# Patient Record
Sex: FEMALE | Race: WHITE | NOT HISPANIC OR LATINO | Employment: FULL TIME | URBAN - METROPOLITAN AREA
[De-identification: names, ages, dates, MRNs, and addresses within clinical notes are randomized per-mention and may not be internally consistent; named-entity substitution may affect disease eponyms.]

---

## 2024-01-03 ENCOUNTER — HOSPITAL ENCOUNTER (EMERGENCY)
Facility: CLINIC | Age: 49
Discharge: HOME | End: 2024-01-03
Attending: EMERGENCY MEDICINE | Admitting: EMERGENCY MEDICINE
Payer: COMMERCIAL

## 2024-01-03 VITALS
RESPIRATION RATE: 16 BRPM | WEIGHT: 150 LBS | HEIGHT: 67 IN | BODY MASS INDEX: 23.54 KG/M2 | OXYGEN SATURATION: 99 % | TEMPERATURE: 98.5 F | HEART RATE: 75 BPM | SYSTOLIC BLOOD PRESSURE: 148 MMHG | DIASTOLIC BLOOD PRESSURE: 89 MMHG

## 2024-01-03 DIAGNOSIS — R45.851 SUICIDAL THOUGHTS: ICD-10-CM

## 2024-01-03 DIAGNOSIS — F41.9 ANXIETY: ICD-10-CM

## 2024-01-03 DIAGNOSIS — F33.2 MDD (MAJOR DEPRESSIVE DISORDER), RECURRENT SEVERE, WITHOUT PSYCHOSIS (H): ICD-10-CM

## 2024-01-03 PROBLEM — F32.9 MAJOR DEPRESSIVE DISORDER, SINGLE EPISODE, UNSPECIFIED: Status: ACTIVE | Noted: 2024-01-03

## 2024-01-03 PROBLEM — F43.22 ADJUSTMENT DISORDER WITH ANXIOUS MOOD: Status: ACTIVE | Noted: 2024-01-03

## 2024-01-03 PROCEDURE — 99284 EMERGENCY DEPT VISIT MOD MDM: CPT | Performed by: NURSE PRACTITIONER

## 2024-01-03 PROCEDURE — 99284 EMERGENCY DEPT VISIT MOD MDM: CPT

## 2024-01-03 RX ORDER — TRAZODONE HYDROCHLORIDE 50 MG/1
25-50 TABLET, FILM COATED ORAL AT BEDTIME
Qty: 30 TABLET | Refills: 0 | Status: SHIPPED | OUTPATIENT
Start: 2024-01-03

## 2024-01-03 RX ORDER — ESCITALOPRAM OXALATE 10 MG/1
TABLET ORAL
Qty: 30 TABLET | Refills: 0 | Status: SHIPPED | OUTPATIENT
Start: 2024-01-03

## 2024-01-03 ASSESSMENT — ACTIVITIES OF DAILY LIVING (ADL)
ADLS_ACUITY_SCORE: 35

## 2024-01-03 NOTE — ED TRIAGE NOTES
Pt called her ex-  New Years Rhea stating she was so depressed that she was going to hurt herself - thinking of suicide. Pt is tearful in triage .      Triage Assessment (Adult)       Row Name 01/03/24 0827          Triage Assessment    Airway WDL WDL        Respiratory WDL    Respiratory WDL WDL        Skin Circulation/Temperature WDL    Skin Circulation/Temperature WDL WDL        Cardiac WDL    Cardiac WDL WDL        Peripheral/Neurovascular WDL    Peripheral Neurovascular WDL WDL        Cognitive/Neuro/Behavioral WDL    Cognitive/Neuro/Behavioral WDL WDL

## 2024-01-03 NOTE — PROGRESS NOTES
Patient in agreement to discharge plan. Discharge instructions reviewed with patient including follow-up care plan. Reviewed safety plan, outpatient resources and new medication. Denies SI,HI and hallucinations. All belongings that were brought into the hospital have been returned to patient. Escorted off the unit at door 6 accompanied by [facility] staff. Discharged to home, ex  to pick her up.

## 2024-01-03 NOTE — DISCHARGE INSTRUCTIONS
Type: Therapy - Initial (In-Person)  Date: Monday, 1/8/2024  Time: 2:00 pm - 3:00 pm  Provider: India SPEAR  Location: [facility], [address] [place] [address]  Phone: [phone]  No patient instructions were provided.  Please call them to confirm the appointment and inquire to see if they need any further intake paperwork.     Aftercare Plan  If I am feeling unsafe or I am in a crisis, I will:   Contact my established care providers   Call the National Suicide Prevention Lifeline: 988  Go to the nearest emergency room   Call 911       Additional Information  Today you were seen by a licensed mental health professional through Triage and Transition services, [facility] ([facility])  for a crisis assessment in the Emergency Department at [facility].  It is recommended that you follow up with your established providers (psychiatrist, mental health therapist, and/or primary care doctor - as relevant) as soon as possible. Coordinators from [facility] will be calling you in the next 24-48 hours to ensure that you have the resources you need.  You can also contact [facility] coordinators directly at [phone]. You may have been scheduled for or offered an appointment with a mental health provider. [facility] maintains an extensive network of licensed behavioral health providers to connect patients with the services they need.  We do not charge providers a fee to participate in our referral network.  We match patients with providers based on a patient's specific needs, insurance coverage, and location.  Our first effort will be to refer you to a provider within your care system, and will utilize providers outside your care system as needed.

## 2024-01-03 NOTE — PROGRESS NOTES
Triage & Transition Services, Extended Care     Client Name: Kylah Leonard    Date: January 3, 2024    Patient was seen    Mode of Assessment:      Service Type: (P) refused to attend group session  Session Start Time:  (P) 1050    Session End Time: (P) 1103  Session Length: (P) 13 minutes  Site Location: [facility] EMERGENCY DEPT                             [id number]  Total Number ofAttendees: (P) 0    Alexsander Davila   Extended Care Coordinator  [phone]

## 2024-01-03 NOTE — ED PROVIDER NOTES
[facility] Unit - Psychiatric Consultation  [facility] Emergency Department    Kylah Leonard MRN: [id number]   Age: 48 year old YOB: 1975     History     Chief Complaint   Patient presents with    Depression     HPI  Kylah Leonard is a 48 year old female with history notable for anxiety and mild depression who presented to the emergency department for evaluation of suicidal ideation.  She called her ex- on New Year's Rhea, stating that she had thought about taking an excess amount of sleeping pills in hopes of not waking up.  She denies taking any medications.  She was medically evaluated in the emergency department and determined to be medically stable for transfer to [facility] for further psychiatric assessment.  Here at [facility], patient describes that she left her  about 1 year ago to pursue a relationship with another man.  That relationship recently ended, and patient is now feeling more lonely and isolated.  She lives alone in an apartment and does not have much social support.  She has been feeling increasingly depressed, hopeless, helpless, and worthless.  She endorses anhedonia.  She endorses poor sleep, sleeping around 5 hours per night but waking up for about 1-1/2 hours in the middle of the night.  She reports her appetite is lower than usual, stating that her coping mechanism is generally to eat, but recently has experienced a loss of appetite.  She acknowledges that she had recently contemplating overdosing on sleeping pills, but has been able to maintain her safety over the last several nights.  She denies active thoughts, plans, or intents to end her life.  She feels comfortable maintaining her safety at home and agrees to reach out should her thoughts intensify.  She is open to antidepressant medication to help treat her symptoms of depression and anxiety, and is looking forward to initiating outpatient therapy.  She anticipates discharge home following this meeting.    Past  "Medical History  No past medical history on file.  No past surgical history on file.  escitalopram (LEXAPRO) 10 MG tablet  traZODone (DESYREL) 50 MG tablet      No Known Allergies  Family History  No family history on file.  Social History       Past medical history, past surgical history, medications, allergies, family history, and social history were reviewed with the patient. No additional pertinent items.       Review of Systems  A medically appropriate review of systems was performed with pertinent positives and negatives noted in the HPI, and all other systems negative.    Physical Examination   BP: (!) 156/95  Pulse: 93  Temp: 99  F (37.2  C)  Resp: 16  Height: 170.2 cm (5' 7\")  Weight: 68 kg (150 lb)  SpO2: 97 %    Physical Exam  General: Appears stated age.   Neuro: Alert and fully oriented. Extremities appear to demonstrate normal strength on visual inspection.   Integumentary/Skin: no rash visualized, normal color    Psychiatric Examination   Appearance: awake, alert, adequately groomed, appeared as age stated, and casually dressed  Attitude:  cooperative  Eye Contact:  fair  Mood:  depressed  Affect:  mood congruent and intensity is blunted  Speech:  clear, coherent and normal prosody  Psychomotor Behavior:  no evidence of tardive dyskinesia, dystonia, or tics  Thought Process:  linear and goal oriented  Associations:  no loose associations  Thought Content:  no evidence of suicidal ideation or homicidal ideation and no evidence of psychotic thought  Insight:  good  Judgement:  intact  Oriented to:  time, person, and place  Attention Span and Concentration:  fair  Recent and Remote Memory:  intact  Language: able to name/identify objects without impairment  Fund of Knowledge: intact with awareness of current and past events    ED Course        Labs Ordered and Resulted from Time of ED Arrival to Time of ED Departure - No data to display    Assessments & Plan (with Medical Decision Making)   Patient " presenting with suicidal ideation in the context of worsening depressive symptoms and anxiety.  She is struggling with social isolation and withdrawal, following a recent break-up.  Remote history of treatment with sertraline for a short period of time, which patient did not find effective.  She is help-seeking, looking forward to individual therapy along with medication to help treat her symptoms.  Nursing notes reviewed noting no acute issues.     I have reviewed the assessment completed by the [facility].     Preliminary diagnosis:    ICD-10-CM    1. Suicidal thoughts  R45.851       2. MDD (major depressive disorder), recurrent severe, without psychosis (H)  F33.2       3. Anxiety  F41.9            Treatment Plan:  - Start escitalopram 5 mg daily for 5 days, then increase to 10 mg daily for treatment of depression and anxiety symptoms.  - Start trazodone 25 mg to 50 mg nightly as needed for insomnia  - Patient will be provided with follow-up appointments for psychiatric medication management as well as individual psychotherapy  - Patient denies active plans or intent to harm herself at this time.  She requests discharge home and will be discharged at her request.      After a period of working with the treatment team on the [facility] unit, the patient's mental state improved to allow a safe transition to outpatient care. After counseling on the diagnosis, work-up, and treatment plan, the patient was discharged. Close follow-up with a psychiatrist and/or therapist was recommended and community psychiatric resources were provided. Patient is to return to the ED if any urgent or potentially life-threatening concerns.     At the time of discharge, the patient's acute suicide risk was determined to be low due to the following factors: Reduction in the intensity of mood/anxiety symptoms that preceded the admission, denial of suicidal thoughts, denies feeling helpless or helpless, not currently under the influence of alcohol or  illicit substances, denies experiencing command hallucinations, no immediate access to firearms. The patient's acute risk could be higher if noncompliant with their treatment plan, medications, follow-up appointments or using illicit substances or alcohol. Protective factors include: social supports, children, stable housing      --  Garry Frohreich, CNP   [facility] EMERGENCY DEPT  [facility] Unit      Garry Salcedo CNP  01/03/24 1121

## 2024-01-03 NOTE — ED PROVIDER NOTES
"  History     Chief Complaint:  Depression       The history is provided by the patient.      Kylah Leonard is a 48 year old female presents to the emergency room with suicidal ideation. On 1/1/2024 she planned on taking an excessive amount of sleeping pills in hopes to not wake up. She called her ex- that night, who talked her out of the plan. Patient reports she left her  one year ago to begin a relationship with someone else. That relationship recently ended. She has been feeling very alone since the break-up and has felt isolated from friends and family. She does report she has been staying with her daughter, which has been helpful. Her daughter left for school yesterday, however. Patient currently has no mental healthcare, but has attempted to set up an appointment with a therapist. Patient states she has no other medical conditions or regular medications.    Independent Historian:   None - Patient Only    Review of External Notes:   None    Medications:    No current outpatient medications on file.    Past Medical History:    No past medical history on file.    Physical Exam   Patient Vitals for the past 24 hrs:   BP Temp Temp src Pulse Resp SpO2 Height Weight   01/03/24 0900 (!) 148/89 98.5  F (36.9  C) Oral 75 16 99 % 1.702 m (5' 7\") 68 kg (150 lb)   01/03/24 0826 (!) 156/95 99  F (37.2  C) Temporal 93 16 97 % 1.702 m (5' 7\") 68 kg (150 lb)        Physical Exam  General: Laying on the ED bed  HEENT: Normocephalic, atraumatic  Cardiac: Well perfused  Pulm: Breathing comfortably, no accessory muscle usage, no conversational dyspnea  MSK: No bony deformities  Skin: Warm and dry  Neuro: Moves all extremities  Psych: Tearful mood and affect    Emergency Department Course   Emergency Department Course & Assessments:    PSS-3      Date and Time Over the past 2 weeks have you felt down, depressed, or hopeless? Over the past 2 weeks have you had thoughts of killing yourself? Have you ever attempted " to kill yourself? When did this last happen? User   24 0828 yes yes no -- [facility]          C-SSRS ([place])      Date and Time Q1 Wished to be Dead (Past Month) Q2 Suicidal Thoughts (Past Month) Q3 Suicidal Thought Method Q4 Suicidal Intent without Specific Plan Q5 Suicide Intent with Specific Plan Q6 Suicide Behavior (Lifetime) Within the Past 3 Months? RETIRED: Level of Risk per Screen Screening Not Complete User   24 0828 yes yes yes no no -- -- -- -- [facility]                Suicide assessment completed by mental health (D.E.C., LCSW, etc.)    Interventions:  Medications - No data to display     Assessments:  0833 I obtained the history and examined the patient as noted above.     Independent Interpretation (X-rays, CTs, rhythm strip):  None    Consultations/Discussion of Management or Tests:  None        Social Determinants of Health affecting care:   None    Disposition:  The patient was transferred to [facility].     Impression & Plan    Medical Decision Makin-year-old female presents with depressive symptoms and suicidal thoughts as above.  No acute medical concerns, seemingly otherwise healthy.  We discussed options for care today including [facility] and DEC consultation.  I believe the patient is a good candidate for [facility] and she is agreeable to that as well.  Plan is for the same.    Diagnosis:    ICD-10-CM    1. Depression, unspecified depression type  F32.A       2. Suicidal thoughts  R45.851          Scribe Disclosure:  I, Jannette Villatoro, am serving as a scribe  for Lorene Rasmussen at 8:53 AM on 1/3/2024 to document services personally performed by Tristin Casey MD based on my observations and the provider's statements to me.    1/3/2024   Tristin Casey MD King, Colin, MD  24 0914

## 2024-01-03 NOTE — ED NOTES
[facility]  ED to [facility] Checklist:      Goal for [facility]: Depression management    Current Behavior: Anxious    Safety Concerns: None    Legal Hold Status: Voluntary    Medically Cleared by ED provider: Yes    Patient Therapeutically Searched: Therapeutic search by ED staff (strings, belts, shoes, pockets, electronics, etc.)    Belongings: Remain with patient    Independent Ambulation at Baseline: Yes/No: Yes    Participates in Care/Conversation: Yes/No: Yes    Patient Informed about [facility]: Yes/No: Yes    DEC: Ordered and pending    Patient Ready to be Transferred to [facility]? Yes/No: Yes

## 2024-01-03 NOTE — ED NOTES
Patient brought over from ED 22 this am. She denies suicidal ideation at this time but states in the last month and half has had suicidal ideation with a plan to overdose on sleeping pills or cut her wrist. She states she wishes that she was dead. She finalized her divorce in July after having many relationships with others and after the divorce the person she left her  for left her.  She lives alone in an apartment now and feels very alone.  She states lots of family and friends have deserted her after the divorce.  She is not on meds and does not have a therapist. This is her first time seeking help for this.Nursing and risk assessments completed.  Assessments reviewed with [facility] and physician. Video monitoring in progress, patient informed.  Admission information reviewed with patient. Patient given a tour of [facility] and instructions on using the facility. Questions regarding [facility] addressed. Pt search completed and belongings inventoried.
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
## 2024-03-10 ENCOUNTER — HEALTH MAINTENANCE LETTER (OUTPATIENT)
Age: 49
End: 2024-03-10

## 2025-03-16 ENCOUNTER — HEALTH MAINTENANCE LETTER (OUTPATIENT)
Age: 50
End: 2025-03-16